# Patient Record
Sex: MALE | Race: BLACK OR AFRICAN AMERICAN | URBAN - METROPOLITAN AREA
[De-identification: names, ages, dates, MRNs, and addresses within clinical notes are randomized per-mention and may not be internally consistent; named-entity substitution may affect disease eponyms.]

---

## 2022-04-10 ENCOUNTER — HISTORICAL (OUTPATIENT)
Dept: ADMINISTRATIVE | Facility: HOSPITAL | Age: 37
End: 2022-04-10

## 2022-04-30 VITALS
SYSTOLIC BLOOD PRESSURE: 120 MMHG | DIASTOLIC BLOOD PRESSURE: 69 MMHG | HEIGHT: 72 IN | WEIGHT: 187.38 LBS | BODY MASS INDEX: 25.38 KG/M2

## 2025-06-30 ENCOUNTER — HOSPITAL ENCOUNTER (EMERGENCY)
Facility: HOSPITAL | Age: 40
Discharge: HOME | End: 2025-06-30
Attending: INTERNAL MEDICINE
Payer: COMMERCIAL

## 2025-06-30 VITALS
WEIGHT: 210 LBS | BODY MASS INDEX: 28.44 KG/M2 | HEIGHT: 72 IN | SYSTOLIC BLOOD PRESSURE: 150 MMHG | TEMPERATURE: 98 F | DIASTOLIC BLOOD PRESSURE: 86 MMHG | RESPIRATION RATE: 18 BRPM | HEART RATE: 85 BPM | OXYGEN SATURATION: 99 %

## 2025-06-30 DIAGNOSIS — H92.02 OTALGIA OF LEFT EAR: ICD-10-CM

## 2025-06-30 DIAGNOSIS — H60.502 ACUTE OTITIS EXTERNA OF LEFT EAR, UNSPECIFIED TYPE: Primary | ICD-10-CM

## 2025-06-30 PROCEDURE — 99283 EMERGENCY DEPT VISIT LOW MDM: CPT

## 2025-06-30 PROCEDURE — 25000003 PHARM REV CODE 250

## 2025-06-30 RX ORDER — CIPROFLOXACIN AND DEXAMETHASONE 3; 1 MG/ML; MG/ML
4 SUSPENSION/ DROPS AURICULAR (OTIC) 2 TIMES DAILY
Qty: 7.5 ML | Refills: 0 | Status: SHIPPED | OUTPATIENT
Start: 2025-06-30 | End: 2025-07-07

## 2025-06-30 RX ORDER — IBUPROFEN 400 MG/1
800 TABLET, FILM COATED ORAL
Status: COMPLETED | OUTPATIENT
Start: 2025-06-30 | End: 2025-06-30

## 2025-06-30 RX ADMIN — IBUPROFEN 800 MG: 400 TABLET, FILM COATED ORAL at 03:06

## 2025-06-30 NOTE — DISCHARGE INSTRUCTIONS
Take medicines as prescribed.  See attached instructions for further information. You can alternate between tylenol and ibuprofen over the counter every 3 hours as needed for pain.  Do not submerge your head under water.  Avoid wearing ear buds that go inside of your ear.    See your family doctor in one to 2 days for further evaluation, workup, and treatment as necessary.    Avoid driving or operating machinery while taking medicines as some medicines might cause drowsiness and may cause problems. Also, pain medicines have potential of being addictive so use pain medications especially narcotics sparingly.    The exam and treatment you received in Emergency Room was for an urgent problem and NOT INTENDED AS COMPLETE CARE. It is important that you FOLLOW UP with a doctor for ongoing care. If your symptoms become WORSE or you DO NOT IMPROVE and you are unable to reach your health care provider, you should RETURN to the emergency department. The Emergency Room doctor has provided a PRELIMINARY INTERPRETATION of all your STUDIES. A final interpretation may be done after you are discharged. IF A CHANGE in your diagnosis or treatment is needed WE WILL CONTACT YOU. It is critical that we have a CURRENT PHONE NUMBER FOR YOU.

## 2025-06-30 NOTE — ED PROVIDER NOTES
Encounter Date: 6/30/2025       History     Chief Complaint   Patient presents with    Otalgia     Pt complaint of left ear pain since lastnight and presents with cottonball in ear     See MDM    The history is provided by the patient. No  was used.     Review of patient's allergies indicates:  No Known Allergies  History reviewed. No pertinent past medical history.  History reviewed. No pertinent surgical history.  No family history on file.  Social History[1]  Review of Systems   Constitutional:  Negative for fever.   HENT:  Positive for ear pain. Negative for congestion and ear discharge.    Respiratory:  Negative for cough.    All other systems reviewed and are negative.      Physical Exam     Initial Vitals [06/30/25 1520]   BP Pulse Resp Temp SpO2   (!) 150/86 85 18 98.4 °F (36.9 °C) 99 %      MAP       --         Physical Exam    Nursing note and vitals reviewed.  Constitutional: He appears well-developed and well-nourished. He is not diaphoretic. No distress.   HENT:   Head: Normocephalic and atraumatic.   Right Ear: External ear and ear canal normal. No drainage or tenderness. No mastoid tenderness. Tympanic membrane is not perforated. No middle ear effusion.   Left Ear: External ear normal. There is swelling and tenderness. No drainage. No mastoid tenderness.   Left ear canal is noted to be swollen with some yellow, moist cerumen noted within.  Unable to visualize TM on the left side due to inflammation and cerumen.  Mild erythema noted around the right TM, otherwise stable.   Eyes: EOM are normal.   Neck:   Normal range of motion.  Cardiovascular:  Normal rate, regular rhythm and intact distal pulses.           Pulmonary/Chest: Breath sounds normal.   Musculoskeletal:         General: Normal range of motion.      Cervical back: Normal range of motion.     Neurological: He is alert and oriented to person, place, and time. GCS score is 15. GCS eye subscore is 4. GCS verbal subscore is 5.  "GCS motor subscore is 6.   Skin: Skin is warm and dry. Capillary refill takes less than 2 seconds.   Psychiatric: He has a normal mood and affect.         ED Course   Procedures  Labs Reviewed - No data to display       Imaging Results    None          Medications   ibuprofen tablet 800 mg (800 mg Oral Given 6/30/25 1955)     Medical Decision Making  The patient is a 39 y.o. male with a pertinent PMHX of none reported who presents to the Emergency Department with a chief complaint of L ear pain. Symptoms began last night and have been constant since onset. Associated symptoms include nothing. The pain is currently rated as a 7/10 in severity and described as throbbing with no radiation.  Symptoms are aggravated with lying on his left side and alleviated with nothing. The patient denies otorrhea, fever, cough, congestion, sore throat, trauma, foreign body in his ear, putting Q-tips in his ear, some region of his head under water, flying, diving. They report taking Tylenol prior to arrival with some relief of symptoms. No other reported symptoms at this time.    Pertinent physical exam findings include Left ear canal is noted to be swollen with some yellow, moist cerumen noted within.  Unable to visualize TM on the left side due to inflammation and cerumen.  Mild erythema noted around the right TM, otherwise stable.  No mastoid erythema, swelling or tenderness bilaterally.  Vital signs on arrival notable for HTN otherwise stable.  Medication for pain given while in the ER.    Medication to treat for patient's AOE sent to the pharmacy.  Patient provided with contact information to establish care with a PCP for follow up.  Discharge instructions and strict return precautions provided. Patient verbalized understanding and agreement of plan. All questions answered.    Portions of this note have been created with voice recognition software. Occasional "wrong-words" or "sound alike" substitutions may have occurred due to " inherent limitations of voice software. Please read the note carefully and recognize, using context, word substitutions may have occurred.       Risk  Prescription drug management.  Risk Details: Strict ED return precautions provided including any new or worsening symptoms. I have spoken with the patient and/or caregivers. I have explained the patient's condition, diagnoses and treatment plan based on the information available to me at this time. I have answered the patient's and/or caregiver's questions and addressed any concerns. The patient and/or caregivers have as good an understanding of the patient's diagnosis, condition and treatment plan as can be expected at this point. The patient's condition is stable and appropriate for discharge from the emergency department.      The patient will pursue further outpatient evaluation with the primary care physician or other designated or consulting physician as outlined in the discharge instructions. The patient and/or caregivers are agreeable to this plan of care and follow-up instructions have been explained in detail. The patient and/or caregivers have received these instructions in written format and have expressed an understanding of the discharge instructions. The patient and/or caregivers are aware that any significant change in condition or worsening of symptoms should prompt an immediate return to this or the closest emergency department or a call to 911.        Additional MDM:   Differential Diagnosis:   Judging by the patient's chief complaint and pertinent history, the patient has the following possible differential diagnoses, including but not limited to the following: aom, aoe, mastoiditis, tm rupture   Some of these are deemed to be lower likelihood and some more likely based on my physical exam and history combined with possible lab work and/or imaging studies. Please see the pertinent studies, and refer to the HPI. Some of these diagnoses will take  further evaluation to fully rule out, perhaps as an outpatient and the patient was encouraged to follow up when discharged for more comprehensive evaluation.                                       Clinical Impression:  Final diagnoses:  [H60.502] Acute otitis externa of left ear, unspecified type (Primary)  [H92.02] Otalgia of left ear          ED Disposition Condition    Discharge Stable          ED Prescriptions       Medication Sig Dispense Start Date End Date Auth. Provider    ciprofloxacin-dexAMETHasone 0.3-0.1% (CIPRODEX) 0.3-0.1 % DrpS Place 4 drops into both ears 2 (two) times daily. for 7 days 7.5 mL 6/30/2025 7/7/2025 Neelima Jackman PA-C          Follow-up Information       Follow up With Specialties Details Why Contact Info Additional Information    [facility] Family Medicine Call in 1 week As needed [address] [facility] [address] [phone] Family Medicine Clinic [address]    [facility] - Emergency Dept Emergency Medicine Go in 1 week If symptoms worsen [address]  [facility] [address] [phone]                    [1]   Social History  Tobacco Use    Smoking status: Some Days     Types: Cigars    Smokeless tobacco: Never   Substance Use Topics    Alcohol use: Yes     Comment: occasionally    Drug use: Never        Neelima Jackman PA-C  06/30/25 2261